# Patient Record
Sex: MALE | Race: WHITE
[De-identification: names, ages, dates, MRNs, and addresses within clinical notes are randomized per-mention and may not be internally consistent; named-entity substitution may affect disease eponyms.]

---

## 2017-04-13 ENCOUNTER — HOSPITAL ENCOUNTER (OUTPATIENT)
Dept: HOSPITAL 45 - C.LAB1850 | Age: 77
Discharge: HOME | End: 2017-04-13
Attending: INTERNAL MEDICINE
Payer: COMMERCIAL

## 2017-04-13 DIAGNOSIS — I25.10: Primary | ICD-10-CM

## 2017-04-13 LAB
ALT SERPL-CCNC: 32 U/L (ref 12–78)
AST SERPL-CCNC: 26 U/L (ref 15–37)
CHOLEST/HDLC SERPL: 2.7 {RATIO}
GLUCOSE UR QL: 43 MG/DL
KETONES UR QL STRIP: 46 MG/DL
NITRITE UR QL STRIP: 128 MG/DL (ref 0–150)
PH UR: 115 MG/DL (ref 0–200)
VERY LOW DENSITY LIPOPROT CALC: 26 MG/DL

## 2017-12-09 ENCOUNTER — HOSPITAL ENCOUNTER (OUTPATIENT)
Dept: HOSPITAL 45 - C.ULTR | Age: 77
Discharge: HOME | End: 2017-12-09
Attending: FAMILY MEDICINE
Payer: COMMERCIAL

## 2017-12-09 DIAGNOSIS — K80.20: ICD-10-CM

## 2017-12-09 DIAGNOSIS — R10.9: Primary | ICD-10-CM

## 2017-12-09 NOTE — DIAGNOSTIC IMAGING REPORT
ABDOMEN COMPLETE (US)



CLINICAL HISTORY: Abdominal pain.    



COMPARISON STUDY:  CT of the abdomen and pelvis December 20, 2016.



FINDINGS: Liver morphology is normal. Several hepatic cysts are noted. There is

no biliary ductal dilatation. A nonmobile gallstone was noted within the

gallbladder. Mild gallbladder wall thickening was noted. The wall measured 5 mm

in thickness. No sonographic Ken sign was elicited. Mild gallbladder

distention was noted. There was no pericholecystic fluid. Pancreas was within

normal limits by sonography although the tail was partially obscured. The size

of the spleen is normal. The right kidney measures 11.3 cm and the left measures

10.3 cm. There is no hydronephrosis. The caliber of the abdominal aorta is

normal.



IMPRESSION:  



1. Cholelithiasis, mild gallbladder wall thickening and mild gallbladder

distention. No sonographic Ken sign. A hepatobiliary scan could be performed

as indicated to evaluate for acute cholecystitis.



2. No biliary ductal dilatation.



3. No hydronephrosis.









Electronically signed by:  Mark Presley M.D.

12/9/2017 12:07 PM



Dictated Date/Time:  12/9/2017 12:05 PM

## 2018-04-16 ENCOUNTER — HOSPITAL ENCOUNTER (OUTPATIENT)
Dept: HOSPITAL 45 - C.LAB1850 | Age: 78
Discharge: HOME | End: 2018-04-16
Attending: INTERNAL MEDICINE
Payer: COMMERCIAL

## 2018-04-16 DIAGNOSIS — E78.00: Primary | ICD-10-CM

## 2018-04-16 LAB
ALT SERPL-CCNC: 33 U/L (ref 12–78)
AST SERPL-CCNC: 26 U/L (ref 15–37)
KETONES UR QL STRIP: 46 MG/DL
PH UR: 117 MG/DL (ref 0–200)